# Patient Record
Sex: FEMALE | Race: WHITE | NOT HISPANIC OR LATINO | Employment: FULL TIME | URBAN - METROPOLITAN AREA
[De-identification: names, ages, dates, MRNs, and addresses within clinical notes are randomized per-mention and may not be internally consistent; named-entity substitution may affect disease eponyms.]

---

## 2024-01-15 ENCOUNTER — INITIAL PRENATAL (OUTPATIENT)
Dept: OBSTETRICS AND GYNECOLOGY | Facility: CLINIC | Age: 44
End: 2024-01-15
Payer: COMMERCIAL

## 2024-01-15 VITALS — SYSTOLIC BLOOD PRESSURE: 140 MMHG | WEIGHT: 178 LBS | DIASTOLIC BLOOD PRESSURE: 78 MMHG

## 2024-01-15 DIAGNOSIS — Z34.91 INITIAL OBSTETRIC VISIT IN FIRST TRIMESTER: ICD-10-CM

## 2024-01-15 DIAGNOSIS — Z01.419 PAP TEST, AS PART OF ROUTINE GYNECOLOGICAL EXAMINATION: Primary | ICD-10-CM

## 2024-01-15 PROBLEM — O09.529 AMA (ADVANCED MATERNAL AGE) MULTIGRAVIDA 35+: Status: ACTIVE | Noted: 2024-01-15

## 2024-01-15 PROBLEM — Z34.00 SUPERVISION OF NORMAL FIRST PREGNANCY, ANTEPARTUM: Status: ACTIVE | Noted: 2024-01-15

## 2024-01-15 PROCEDURE — 0501F PRENATAL FLOW SHEET: CPT | Performed by: OBSTETRICS & GYNECOLOGY

## 2024-01-15 RX ORDER — PRENATAL VIT/IRON FUM/FOLIC AC 27MG-0.8MG
TABLET ORAL DAILY
COMMUNITY

## 2024-01-15 NOTE — PROGRESS NOTES
Initial ob visit     CC- Here for care of pregnancy        Abbey Hammond is a 43 y.o. female, , who presents for her first obstetrical visit.  Her last LMP was Patient's last menstrual period was 2023.. Her NYA is Not found.. Current GA is Unknown.     Initial positive test date : 12/15/2023, UPT        Her periods are: regular per pt  Prior obstetric issues: none  Patient's past medical history is significant for:  denies .  Family history of genetic issues (includes FOB): denies  Prior infections concerning in pregnancy (Rash, fever in last 2 weeks): No  Varicella Hx - history of chicken pox  Prior testing for Cystic Fibrosis Carrier or Sickle Cell Trait- denies  Prepregnancy BMI - There is no height or weight on file to calculate BMI.  History of STD: yes GC/CHLAMYDIA  Hx of HSV for patient or partner: no  Ultrasound Today: yes    OB History    Para Term  AB Living   2       1 0   SAB IAB Ectopic Molar Multiple Live Births   1                # Outcome Date GA Lbr [place]/2nd Weight Sex Delivery Anes PTL Lv   2 Current            1 SAB 20               Additional Pertinent History   Last Pap : never had one Result: N/A HPV:  na     Last Completed Pap Smear       This patient has no relevant Health Maintenance data.          History of abnormal Pap smear: no  Family history of uterine, colon, breast, or ovarian cancer: yes - breast cancer  Feelings of Anxiety or Depression: yes - anxiety  Tobacco Usage?: Yes Abbey Hammond  reports that she has been smoking cigarettes. She has never used smokeless tobacco.. I have educated her on the risk of diseases from using tobacco products such as cancer, COPD, and heart disease.     I advised her to quit and she is willing to quit. We have discussed the following method/s for tobacco cessation:  Education Material.  Together we have set a quit date for 1 month from today.  She will follow up with me in 4 weeks or sooner to check on her  progress.    I spent 3  minutes counseling the patient.        Alcohol/Drug Use?: NO  Over the age of 35 at delivery: yes  Desires Genetic Screening: None  Flu Status: Desires at future appt    [facility]    Current Outpatient Medications:     Prenatal Vit-Fe Fumarate-FA (prenatal vitamin 27-0.8) 27-0.8 MG tablet tablet, Take  by mouth Daily., Disp: , Rfl:      History reviewed. No pertinent past medical history.     Past Surgical History:   Procedure Laterality Date    GALLBLADDER SURGERY         Review of Systems   Review of Systems   Constitutional: Negative.    Respiratory: Negative.     Cardiovascular: Negative.    Gastrointestinal: Negative.    Genitourinary: Negative.    Musculoskeletal: Negative.    Neurological: Negative.    Hematological: Negative.    Psychiatric/Behavioral: Negative.         Patient Reports: no problems  Patient Denies: Nausea, Nausea and vomiting, Spotting, Heavy bleeding, Cramping, and Fatigue  All systems reviewed and otherwise normal.    I have reviewed and agree with the HPI, ROS, and historical information as entered above. Cristóbal Quigley MD      /78   Wt 80.7 kg (178 lb)   LMP 11/18/2023     The additional following portions of the patient's history were reviewed and updated as appropriate: allergies, current medications, past family history, past medical history, past social history, past surgical history, and problem list.    Physical Exam  General:  well developed; well nourished  no acute distress   Chest/Respiratory: No labored breathing, normal respiratory effort, normal appearance, no respiratory noises noted   Heart:  normal rate, regular rhythm,  no murmurs, rubs, or gallops   Thyroid: normal to inspection and palpation   Breasts:  Not performed.   Abdomen: soft, non-tender; no masses  no umbilical or inguinal hernias are present  no hepato-splenomegaly   Pelvis: Clinical staff was present for exam  External genitalia:  normal appearance of the external genitalia  including Bartholin's and [place]'s glands.  :  urethral meatus normal;  Vaginal:  normal pink mucosa without prolapse or lesions.  Cervix:  normal appearance.        Assessment and Plan    Problem List Items Addressed This Visit    None  Visit Diagnoses       Pap test, as part of routine gynecological examination    -  Primary    Relevant Orders    LIQUID-BASED PAP SMEAR WITH HPV GENOTYPING REGARDLESS OF INTERPRETATION ([facility])    Initial obstetric visit in first trimester        Relevant Orders    Obstetric Panel    HIV-1 / O / 2 Ag / Antibody    Urine Culture - Urine, Urine, Clean Catch    Urine Drug Screen - Urine, Clean Catch            Pregnancy at Unknown  Reviewed routine prenatal care with the office and educational materials given  Lab(s) Ordered  Discussed options for genetic testing including first trimester nuchal translucency screen, genetic disease carrier testing, quadruple screen, and NIPT  Patient is on Prenatal vitamins  discussed baby aspirin from 10-36 weeks for prevention of preeclampsia   Return in about 4 weeks (around 2/12/2024) for Routine prenatal care.      Cristóbal Quigley MD  01/15/2024

## 2024-01-15 NOTE — PATIENT INSTRUCTIONS
Prenatal Care  Prenatal care is health care during pregnancy. It helps you and your unborn baby (fetus) stay as healthy as possible. Prenatal care may be provided by a midwife, a family practice doctor, a mid-level practitioner (nurse practitioner or physician assistant), or a childbirth and pregnancy doctor (obstetrician).  How does this affect me?  During pregnancy, you will be closely monitored for any new conditions that might develop. To lower your risk of pregnancy complications, you and your health care provider will talk about any underlying conditions you have.  How does this affect my baby?  Early and consistent prenatal care increases the chance that your baby will be healthy during pregnancy. Prenatal care lowers the risk that your baby will be:  Born early (prematurely).  Smaller than expected at birth (small for gestational age).  What can I expect at the first prenatal care visit?  Your first prenatal care visit will likely be the longest. You should schedule your first prenatal care visit as soon as you know that you are pregnant. Your first visit is a good time to talk about any questions or concerns you have about pregnancy.  Medical history  At your visit, you and your health care provider will talk about your medical history, including:  Any past pregnancies.  Your family's medical history.  Medical history of the baby's father.  Any long-term (chronic) health conditions you have and how you manage them.  Any surgeries or procedures you have had.  Any current over-the-counter or prescription medicines, herbs, or supplements that you are taking.  Other factors that could pose a risk to your baby, including:  Exposure to harmful chemicals or radiation at work or at home.  Any substance use, including tobacco, alcohol, and drug use.  Your home setting and your stress levels, including:  Exposure to abuse or violence.  Household financial strain.  Your daily health habits, including diet and  exercise.  Tests and screenings  Your health care provider will:  Measure your weight, height, and blood pressure.  Do a physical exam, including a pelvic and breast exam.  Perform blood tests and urine tests to check for:  Urinary tract infection.  Sexually transmitted infections (STIs).  Low iron levels in your blood (anemia).  Blood type and certain proteins on red blood cells (Rh antibodies).  Infections and immunity to viruses, such as hepatitis B and rubella.  HIV (human immunodeficiency virus).  Discuss your options for genetic screening.  Tips about staying healthy  Your health care provider will also give you information about how to keep yourself and your baby healthy, including:  Nutrition and taking vitamins.  Physical activity.  How to manage pregnancy symptoms such as nausea and vomiting (morning sickness).  Infections and substances that may be harmful to your baby and how to avoid them.  Food safety.  Dental care.  Working.  Travel.  Warning signs to watch for and when to call your health care provider.  How often will I have prenatal care visits?  After your first prenatal care visit, you will have regular visits throughout your pregnancy. The visit schedule is often as follows:  Up to week 28 of pregnancy: once every 4 weeks.  28-36 weeks: once every 2 weeks.  After 36 weeks: every week until delivery.  Some women may have visits more or less often depending on any underlying health conditions and the health of the baby.  Keep all follow-up and prenatal care visits. This is important.  What happens during routine prenatal care visits?  Your health care provider will:  Measure your weight and blood pressure.  Check for fetal heart sounds.  Measure the height of your uterus in your abdomen (fundal height). This may be measured starting around week 20 of pregnancy.  Check the position of your baby inside your uterus.  Ask questions about your diet, sleeping patterns, and whether you can feel the baby  move.  Review warning signs to watch for and signs of labor.  Ask about any pregnancy symptoms you are having and how you are dealing with them. Symptoms may include:  Headaches.  Nausea and vomiting.  Vaginal discharge.  Swelling.  Fatigue.  Constipation.  Changes in your vision.  Feeling persistently sad or anxious.  Any discomfort, including back or pelvic pain.  Bleeding or spotting.  Make a list of questions to ask your health care provider at your routine visits.  What tests might I have during prenatal care visits?  You may have blood, urine, and imaging tests throughout your pregnancy, such as:  Urine tests to check for glucose, protein, or signs of infection.  Glucose tests to check for a form of diabetes that can develop during pregnancy (gestational diabetes mellitus). This is usually done around week 24 of pregnancy.  Ultrasounds to check your baby's growth and development, to check for birth defects, and to check your baby's well-being. These can also help to decide when you should deliver your baby.  A test to check for group B strep (GBS) infection. This is usually done around week 36 of pregnancy.  Genetic testing. This may include blood, fluid, or tissue sampling, or imaging tests, such as an ultrasound. Some genetic tests are done during the first trimester and some are done during the second trimester.  What else can I expect during prenatal care visits?  Your health care provider may recommend getting certain vaccines during pregnancy. These may include:  A yearly flu shot (annual influenza vaccine). This is especially important if you will be pregnant during flu season.  Tdap (tetanus, diphtheria, pertussis) vaccine. Getting this vaccine during pregnancy can protect your baby from whooping cough (pertussis) after birth. This vaccine may be recommended between weeks 27 and 36 of pregnancy.  A COVID-19 vaccine.  Later in your pregnancy, your health care provider may give you information  about:  Childbirth and breastfeeding classes.  Choosing a health care provider for your baby.  Umbilical cord banking.  Breastfeeding.  Birth control after your baby is born.  The hospital labor and delivery unit and how to set up a tour.  Registering at the hospital before you go into labor.  Where to find more information  Office on Women's Health: womenshealth.gov  American Pregnancy Association: americanpregnancy.org  March of Dimes: marchofdimes.org  Summary  Prenatal care helps you and your baby stay as healthy as possible during pregnancy.  Your first prenatal care visit will most likely be the longest.  You will have visits and tests throughout your pregnancy to monitor your health and your baby's health.  Bring a list of questions to your visits to ask your health care provider.  Make sure to keep all follow-up and prenatal care visits.  This information is not intended to replace advice given to you by your health care provider. Make sure you discuss any questions you have with your health care provider.  Document Revised: 09/30/2021 Document Reviewed: 09/30/2021  [employer] Patient Education © 2023 [employer]

## 2024-01-16 LAB — MED LIST OPTION NOT SELECTED: NORMAL

## 2024-01-18 LAB
ABO GROUP BLD: ABNORMAL
AMPHETAMINES UR QL SCN: NEGATIVE NG/ML
BACTERIA UR CULT: NORMAL
BACTERIA UR CULT: NORMAL
BARBITURATES UR QL SCN: NEGATIVE NG/ML
BASOPHILS # BLD AUTO: 0.1 X10E3/UL (ref 0–0.2)
BASOPHILS NFR BLD AUTO: 1 %
BENZODIAZ UR QL SCN: NEGATIVE NG/ML
BLD GP AB SCN SERPL QL: NEGATIVE
BZE UR QL SCN: NEGATIVE NG/ML
CANNABINOIDS UR QL SCN: POSITIVE NG/ML
CREAT UR-MCNC: 150.9 MG/DL (ref 20–300)
EOSINOPHIL # BLD AUTO: 0.2 X10E3/UL (ref 0–0.4)
EOSINOPHIL NFR BLD AUTO: 2 %
ERYTHROCYTE [DISTWIDTH] IN BLOOD BY AUTOMATED COUNT: 14.2 % (ref 11.7–15.4)
HBV SURFACE AG SERPL QL IA: NEGATIVE
HCT VFR BLD AUTO: 45.5 % (ref 34–46.6)
HCV IGG SERPL QL IA: NON REACTIVE
HGB BLD-MCNC: 15 G/DL (ref 11.1–15.9)
HIV 1+2 AB+HIV1 P24 AG SERPL QL IA: NON REACTIVE
IMM GRANULOCYTES # BLD AUTO: 0 X10E3/UL (ref 0–0.1)
IMM GRANULOCYTES NFR BLD AUTO: 0 %
LABORATORY COMMENT REPORT: ABNORMAL
LYMPHOCYTES # BLD AUTO: 1.9 X10E3/UL (ref 0.7–3.1)
LYMPHOCYTES NFR BLD AUTO: 21 %
MCH RBC QN AUTO: 27.8 PG (ref 26.6–33)
MCHC RBC AUTO-ENTMCNC: 33 G/DL (ref 31.5–35.7)
MCV RBC AUTO: 84 FL (ref 79–97)
METHADONE UR QL SCN: NEGATIVE NG/ML
MONOCYTES # BLD AUTO: 0.9 X10E3/UL (ref 0.1–0.9)
MONOCYTES NFR BLD AUTO: 10 %
NEUTROPHILS # BLD AUTO: 6 X10E3/UL (ref 1.4–7)
NEUTROPHILS NFR BLD AUTO: 66 %
OPIATES UR QL SCN: NEGATIVE NG/ML
OXYCODONE+OXYMORPHONE UR QL SCN: NEGATIVE NG/ML
PCP UR QL: NEGATIVE NG/ML
PH UR: 5.2 [PH] (ref 4.5–8.9)
PLATELET # BLD AUTO: 295 X10E3/UL (ref 150–450)
PROPOXYPH UR QL SCN: NEGATIVE NG/ML
RBC # BLD AUTO: 5.4 X10E6/UL (ref 3.77–5.28)
RH BLD: POSITIVE
RPR SER QL: NON REACTIVE
RUBV IGG SERPL IA-ACNC: 3.37 INDEX
WBC # BLD AUTO: 9.1 X10E3/UL (ref 3.4–10.8)

## 2024-01-19 LAB — REF LAB TEST METHOD: NORMAL

## 2024-01-22 ENCOUNTER — DOCUMENTATION (OUTPATIENT)
Dept: OBSTETRICS AND GYNECOLOGY | Facility: CLINIC | Age: 44
End: 2024-01-22
Payer: COMMERCIAL

## 2024-01-23 ENCOUNTER — ROUTINE PRENATAL (OUTPATIENT)
Dept: OBSTETRICS AND GYNECOLOGY | Facility: CLINIC | Age: 44
End: 2024-01-23
Payer: COMMERCIAL

## 2024-01-23 ENCOUNTER — TELEPHONE (OUTPATIENT)
Dept: OBSTETRICS AND GYNECOLOGY | Facility: CLINIC | Age: 44
End: 2024-01-23

## 2024-01-23 VITALS — SYSTOLIC BLOOD PRESSURE: 122 MMHG | DIASTOLIC BLOOD PRESSURE: 74 MMHG | WEIGHT: 177 LBS

## 2024-01-23 DIAGNOSIS — Z3A.09 9 WEEKS GESTATION OF PREGNANCY: ICD-10-CM

## 2024-01-23 DIAGNOSIS — O09.521 MULTIGRAVIDA OF ADVANCED MATERNAL AGE IN FIRST TRIMESTER: Primary | ICD-10-CM

## 2024-01-23 DIAGNOSIS — D18.1 CYSTIC HYGROMA: ICD-10-CM

## 2024-01-23 PROBLEM — Z34.90 PREGNANCY: Status: ACTIVE | Noted: 2024-01-23

## 2024-01-23 PROCEDURE — 0502F SUBSEQUENT PRENATAL CARE: CPT | Performed by: NURSE PRACTITIONER

## 2024-01-23 NOTE — TELEPHONE ENCOUNTER
Caller: Abbey Hammond    Relationship to patient: Self    Best call back number: [phone] (home)  CAN CALL BACK       OB PT VAG BLEEDING BRIGHT RED BLOOD SINCE LAST NIGHT.  NO INTERCOURSE.    UNABLE TO WT CALL

## 2024-01-23 NOTE — PROGRESS NOTES
OB FOLLOW UP  CC- Here for care of pregnancy, W/I cramping and spotting        Abbey Hammond is a 43 y.o.  9w3d patient being seen today for her obstetrical follow up visit. Patient reports bright red bleeding with cramping. Patient states it feels like someone is pressing on her stomach. She is feeling a lot of pressure like gas or a bowel movement. Patient has a colposcopy on Monday.    Her prenatal care is complicated by (and status) : AMA  Patient Active Problem List   Diagnosis    AMA (advanced maternal age) multigravida 35+    Supervision of normal first pregnancy, antepartum    Cystic hygroma    Pregnancy       Desires genetic testing?: Yes with Gender  Flu Status: Declines  Ultrasound Today: Yes    ROS -   Patient Reports : Cramping and bright red bleeding  Patient Denies: Loss of Fluid, Vision Changes, Headaches, Nausea , and Vomiting   Fetal Movement :  absent  All other systems reviewed and are negative.     The additional following portions of the patient's history were reviewed and updated as appropriate: allergies, current medications, past family history, past medical history, past social history, past surgical history, and problem list.    I have reviewed and agree with the HPI, ROS, and historical information as entered above. Tammy Mendez, APRN          /74   Wt 80.3 kg (177 lb)   LMP 2023         EXAM:     Prenatal Vitals  BP: 122/74  Weight: 80.3 kg (177 lb)   Fetal Heart Rate: 165                  Assessment and Plan    Problem List Items Addressed This Visit          Gravid and     AMA (advanced maternal age) multigravida 35+ - Primary    Relevant Orders    [id number] PLUS Core+SCA+ESS - Blood,    Pregnancy       Hematology and Neoplasia    Cystic hygroma    Overview     Possible cystic hygroma at 9 weeks. [id number] drawn. The patient declined PDC referral for NT at the time of the appointment, want to discuss with Dr. Quigley          U/S today with  good interval growth and + FHT, possible cystic hygroma. She describes bleeding as bright red spotting that turns to brown. Mostly occurring in the evening, no recent intercourse. She also complains of bloating and cramping and admits constipation. Advised 80 oz water daily, increase fiber, add miralax daily.     Pregnancy at 9w3d  Labs reviewed from New OB Visit.  Counseled on genetic testing, carrier status and option for NT screen. [id number] today, declines PDC referral today, will d/w Dr. Quigley next week.   Activity and Exercise discussed.  Call for increased bleeding or cramping  Return for Next scheduled follow up.    Tammy Mendez, APRN  01/23/2024

## 2024-01-29 ENCOUNTER — OFFICE VISIT (OUTPATIENT)
Dept: OBSTETRICS AND GYNECOLOGY | Facility: CLINIC | Age: 44
End: 2024-01-29
Payer: COMMERCIAL

## 2024-01-29 VITALS — WEIGHT: 179 LBS | SYSTOLIC BLOOD PRESSURE: 98 MMHG | DIASTOLIC BLOOD PRESSURE: 80 MMHG

## 2024-01-29 DIAGNOSIS — O28.5 MATERNAL SERUM SCREEN POSITIVE FOR TRISOMY 18: Primary | ICD-10-CM

## 2024-01-29 DIAGNOSIS — R87.613 HSIL (HIGH GRADE SQUAMOUS INTRAEPITHELIAL LESION) ON PAP SMEAR OF CERVIX: ICD-10-CM

## 2024-01-29 LAB
5P15 DELETION (CRI-DU-CHAT): NOT DETECTED
CFDNA.FET/CFDNA.TOTAL SFR FETUS: ABNORMAL %
CITATION REF LAB TEST: ABNORMAL
FET 13+18+21+X+Y ANEUP PLAS.CFDNA: POSITIVE
FET 1P36 DEL RISK WBC.DNA+CFDNA QL: NOT DETECTED
FET 22Q11.2 DEL RISK WBC.DNA+CFDNA QL: NOT DETECTED
FET CHR 11Q23 DEL PLAS.CFDNA QL: NOT DETECTED
FET CHR 15Q11 DEL PLAS.CFDNA QL: NOT DETECTED
FET CHR 21 TS PLAS.CFDNA QL: NEGATIVE
FET CHR 4P16 DEL PLAS.CFDNA QL: NOT DETECTED
FET CHR 8Q24 DEL PLAS.CFDNA QL: NOT DETECTED
FET MS X RISK WBC.DNA+CFDNA QL: NOT DETECTED
FET SEX PLAS.CFDNA DOSAGE CFDNA: ABNORMAL
FET TS 13 RISK PLAS.CFDNA QL: NEGATIVE
FET TS 18 RISK WBC.DNA+CFDNA QL: ABNORMAL
FET X + Y ANEUP RISK PLAS.CFDNA SEQ-IMP: NOT DETECTED
GA EST FROM CONCEPTION DATE: ABNORMAL D
GESTATIONAL AGE > 9:: YES
LAB DIRECTOR NAME PROVIDER: ABNORMAL
LAB DIRECTOR NAME PROVIDER: ABNORMAL
LABORATORY COMMENT REPORT: ABNORMAL
LIMITATIONS OF THE TEST: ABNORMAL
NEGATIVE PREDICTIVE VALUE: ABNORMAL
NOTE: ABNORMAL
PERFORMANCE CHARACTERISTICS: ABNORMAL
POSITIVE PREDICTIVE VALUE: ABNORMAL
REF LAB TEST METHOD: ABNORMAL
TEST PERFORMANCE INFO SPEC: ABNORMAL
TRIOSOMY 16: NOT DETECTED
TRISOMY 22: NOT DETECTED

## 2024-01-29 PROCEDURE — 57455 BIOPSY OF CERVIX W/SCOPE: CPT | Performed by: OBSTETRICS & GYNECOLOGY

## 2024-01-29 NOTE — PROGRESS NOTES
Colposcopy Procedure Note        Procedures    Indications: Abbey Hammond is a 43 y.o. female, , whose Patient's last menstrual period was 2023..  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 Prior cervical treatment includes: no treatment. She understands the need for the procedure and is aware of the complications, including post-colposcopic vaginal bleeding, vaginal leukorrhea or cervicitis.  She is aware she may experience discomfort.  After being presented with the risk, benefits, and alternatives the patient wished to proceed.      Urine pregnancy test done in the office today was Positive.      The patient has not had Gardasil.  She is a smoker.      Procedure Details   The risks and benefits of the procedure and Verbal informed consent obtained. Time out: immediate members of the procedure team and patient agree to the following: correct patient, correct site, correct procedure to be performed. Cristóbal Quigley MD      She was positioned in the dorsal lithotomy position and a speculum was inserted into the vagina and excellent visualization of cervix achieved, cervix swabbed x 3 with acetic acid solution. The transformation zone was completely visualized.  A cervical biopsy was obtained at 6 o'clock.  An endocervical curettage was not performed.  This colposcopy was satisfactory.     Findings:  The procedure was notable for:  Physical Exam  Vitals reviewed. Exam conducted with a chaperone present.   Constitutional:       Appearance: Normal appearance. She is normal weight.   HENT:      Head: Normocephalic and atraumatic.   Genitourinary:     General: Normal vulva.      Vagina: Normal.            Comments: Punctations at 6 oclock consistent with high grade lesion.   Skin:     General: Skin is warm and dry.   Neurological:      Mental Status: She is alert and  oriented to person, place, and time.   Psychiatric:         Mood and Affect: Mood normal.         Behavior: Behavior normal.       BP 98/80   Wt 81.2 kg (179 lb)   LMP 2023       Specimens: cervical biopsy  Specimens labelled and sent to Pathology.    Complications: none.    The patient tolerated the procedure very well and with mild discomfort and bleeding.    Assessment and Plan    Problem List Items Addressed This Visit    None  Visit Diagnoses       Maternal serum screen positive for trisomy 18    -  Primary    Relevant Orders    [facility]    HSIL (high grade squamous intraepithelial lesion) on Pap smear of cervix        Relevant Orders    TISSUE EXAM, [facility] ([facility])            Will base further treatment on Pathology findings.  Treatment options discussed with patient.  Post biopsy instructions given to patient.  Repeat pap smear and possible LEEP following delivery    Cristóbal Quigley MD  2024

## 2024-01-30 LAB — REF LAB TEST METHOD: NORMAL

## 2024-02-14 ENCOUNTER — OFFICE VISIT (OUTPATIENT)
Dept: OBSTETRICS AND GYNECOLOGY | Facility: HOSPITAL | Age: 44
End: 2024-02-14
Payer: COMMERCIAL

## 2024-02-14 ENCOUNTER — ROUTINE PRENATAL (OUTPATIENT)
Dept: OBSTETRICS AND GYNECOLOGY | Facility: CLINIC | Age: 44
End: 2024-02-14
Payer: COMMERCIAL

## 2024-02-14 ENCOUNTER — HOSPITAL ENCOUNTER (OUTPATIENT)
Dept: WOMENS IMAGING | Facility: HOSPITAL | Age: 44
Discharge: HOME | End: 2024-02-14
Admitting: OBSTETRICS & GYNECOLOGY
Payer: COMMERCIAL

## 2024-02-14 VITALS
BODY MASS INDEX: 31.96 KG/M2 | WEIGHT: 180.4 LBS | HEIGHT: 63 IN | SYSTOLIC BLOOD PRESSURE: 80 MMHG | DIASTOLIC BLOOD PRESSURE: 59 MMHG

## 2024-02-14 VITALS — WEIGHT: 180 LBS | DIASTOLIC BLOOD PRESSURE: 68 MMHG | SYSTOLIC BLOOD PRESSURE: 122 MMHG | BODY MASS INDEX: 31.89 KG/M2

## 2024-02-14 DIAGNOSIS — R87.613 HIGH GRADE SQUAMOUS INTRAEPITHELIAL LESION (HGSIL) ON CYTOLOGIC SMEAR OF CERVIX: ICD-10-CM

## 2024-02-14 DIAGNOSIS — Z3A.12 12 WEEKS GESTATION OF PREGNANCY: Primary | ICD-10-CM

## 2024-02-14 DIAGNOSIS — R82.998 LEUKOCYTES IN URINE: ICD-10-CM

## 2024-02-14 DIAGNOSIS — D18.1 CYSTIC HYGROMA: ICD-10-CM

## 2024-02-14 DIAGNOSIS — Z34.90 PREGNANCY, UNSPECIFIED GESTATIONAL AGE: ICD-10-CM

## 2024-02-14 DIAGNOSIS — O28.5 ABNORMAL GENETIC TEST DURING PREGNANCY: ICD-10-CM

## 2024-02-14 DIAGNOSIS — R31.29 OTHER MICROSCOPIC HEMATURIA: ICD-10-CM

## 2024-02-14 DIAGNOSIS — O46.90 VAGINAL BLEEDING DURING PREGNANCY: ICD-10-CM

## 2024-02-14 DIAGNOSIS — O28.5 MATERNAL SERUM SCREEN POSITIVE FOR TRISOMY 18: ICD-10-CM

## 2024-02-14 DIAGNOSIS — O09.529 ANTEPARTUM MULTIGRAVIDA OF ADVANCED MATERNAL AGE: ICD-10-CM

## 2024-02-14 DIAGNOSIS — O09.529 ANTEPARTUM MULTIGRAVIDA OF ADVANCED MATERNAL AGE: Primary | ICD-10-CM

## 2024-02-14 LAB
BILIRUB BLD-MCNC: NEGATIVE MG/DL
CLARITY, POC: CLEAR
COLOR UR: YELLOW
GLUCOSE UR STRIP-MCNC: NEGATIVE MG/DL
KETONES UR QL: NEGATIVE
LEUKOCYTE EST, POC: ABNORMAL
NITRITE UR-MCNC: NEGATIVE MG/ML
PH UR: 6 [PH] (ref 5–8)
PROT UR STRIP-MCNC: ABNORMAL MG/DL
RBC # UR STRIP: ABNORMAL /UL
SP GR UR: 1.03 (ref 1–1.03)
UROBILINOGEN UR QL: NORMAL

## 2024-02-14 PROCEDURE — 76813 OB US NUCHAL MEAS 1 GEST: CPT

## 2024-02-14 PROCEDURE — 76801 OB US < 14 WKS SINGLE FETUS: CPT

## 2024-02-14 RX ORDER — AMOXICILLIN AND CLAVULANATE POTASSIUM 875; 125 MG/1; MG/1
1 TABLET, FILM COATED ORAL 2 TIMES DAILY
Qty: 14 TABLET | Refills: 0 | Status: SHIPPED | OUTPATIENT
Start: 2024-02-14 | End: 2024-02-21

## 2024-02-16 LAB
BACTERIA UR CULT: NORMAL
BACTERIA UR CULT: NORMAL

## 2024-02-19 ENCOUNTER — OFFICE VISIT (OUTPATIENT)
Dept: OBSTETRICS AND GYNECOLOGY | Facility: CLINIC | Age: 44
End: 2024-02-19
Payer: COMMERCIAL

## 2024-02-19 VITALS — DIASTOLIC BLOOD PRESSURE: 75 MMHG | SYSTOLIC BLOOD PRESSURE: 118 MMHG | HEIGHT: 63 IN | BODY MASS INDEX: 31.89 KG/M2

## 2024-02-19 DIAGNOSIS — R87.613 HIGH GRADE SQUAMOUS INTRAEPITHELIAL LESION (HGSIL) ON CYTOLOGIC SMEAR OF CERVIX: ICD-10-CM

## 2024-02-19 DIAGNOSIS — O03.9 COMPLETE ABORTION: Primary | ICD-10-CM

## 2024-02-19 PROBLEM — O03.4 INCOMPLETE ABORTION: Status: ACTIVE | Noted: 2024-02-19

## 2024-02-19 PROCEDURE — 99213 OFFICE O/P EST LOW 20 MIN: CPT | Performed by: OBSTETRICS & GYNECOLOGY

## 2024-02-19 NOTE — PROGRESS NOTES
Chief Complaint   Patient presents with    Follow-up     Miscarriage, seen in the ER at [facility] on 2/15/24       Subjective   HPI  Abbey Hammond is a 43 y.o. female, .  Patient's last menstrual period was 2023.. who presents for an ER follow up visit after experiencing a miscarriage. Patient was seen in the ER at [facility] on 2/15/24 for abdominal pain and bleeding. Records are in the patients chart and indicate that upon vaginal exam the fetus and umbilical cord was passed. She was sent to L&D for further monitoring due to concern for retained placenta. Cytotec PO was given in which the placenta was then passed according to her records.    Patient reports her bleeding has lightened up and she is experiencing some cramping. She is taking Ibuprofen as needed. She also reports some headaches that are relieved by Tylenol.    OSH results of labs reviewed and discussed.   OSH results of US reviewed and discussed.     US performed today with no evidence of retained POC.  Discussed findings and will follow HCG levels.       Current Outpatient Medications:     amoxicillin-clavulanate (AUGMENTIN) 875-125 MG per tablet, Take 1 tablet by mouth 2 (Two) Times a Day for 7 days., Disp: 14 tablet, Rfl: 0    Prenatal Vit-Fe Fumarate-FA (prenatal vitamin 27-0.8) 27-0.8 MG tablet tablet, Take  by mouth Daily., Disp: , Rfl:      Past Medical History:   Diagnosis Date    Abnormal Pap smear of cervix         Past Surgical History:   Procedure Laterality Date    GALLBLADDER SURGERY         The additional following portions of the patient's history were reviewed and updated as appropriate: allergies.    Review of Systems   Constitutional: Negative.    Respiratory: Negative.     Cardiovascular: Negative.    Gastrointestinal: Negative.    Genitourinary:  Positive for menstrual problem. Negative for pelvic pain.   Musculoskeletal: Negative.    Neurological: Negative.    Psychiatric/Behavioral:  "Negative.         I have reviewed and agree with the HPI, ROS, and historical information as entered above. Cristóbal Quigley MD      Objective   /75   Ht 160 cm (63\")   LMP 2023   Breastfeeding Unknown   BMI 31.89 kg/m²     Physical Exam  Vitals reviewed.   Constitutional:       Appearance: Normal appearance. She is obese.   HENT:      Head: Normocephalic and atraumatic.   Skin:     General: Skin is warm and dry.   Neurological:      Mental Status: She is alert and oriented to person, place, and time.   Psychiatric:         Mood and Affect: Mood normal.         Behavior: Behavior normal.         Assessment & Plan     Assessment     Problem List Items Addressed This Visit       High grade squamous intraepithelial lesion (HGSIL) on cytologic smear of cervix    Overview     1/15/2024- Pap HSIL, HPV 16 and Pool+    24- Colpo  CERVIX, BIOPSY, 6:00:   Transformation zone with high-grade squamous intraepithelial lesion (MARILYN 3)     CBF-Repeat pap smear and possible LEEP following delivery.          Incomplete  - Primary       Complete     Plan     Will follow hcg levels to zero. NSAIDs prn for cramping.  Return in about 2 weeks (around 3/4/2024) for schedule LEEP in [place] in 2 weeks.        Cristóbal Quigley MD  2024   "

## 2024-02-20 LAB
ERYTHROCYTE [DISTWIDTH] IN BLOOD BY AUTOMATED COUNT: 14.1 % (ref 11.7–15.4)
HCG INTACT+B SERPL-ACNC: 554 MIU/ML
HCT VFR BLD AUTO: 39.2 % (ref 34–46.6)
HGB BLD-MCNC: 12.7 G/DL (ref 11.1–15.9)
MCH RBC QN AUTO: 28 PG (ref 26.6–33)
MCHC RBC AUTO-ENTMCNC: 32.4 G/DL (ref 31.5–35.7)
MCV RBC AUTO: 87 FL (ref 79–97)
PLATELET # BLD AUTO: 325 X10E3/UL (ref 150–450)
RBC # BLD AUTO: 4.53 X10E6/UL (ref 3.77–5.28)
WBC # BLD AUTO: 9.5 X10E3/UL (ref 3.4–10.8)

## 2024-03-06 ENCOUNTER — OFFICE VISIT (OUTPATIENT)
Dept: OBSTETRICS AND GYNECOLOGY | Facility: CLINIC | Age: 44
End: 2024-03-06
Payer: COMMERCIAL

## 2024-03-06 VITALS
WEIGHT: 191.4 LBS | SYSTOLIC BLOOD PRESSURE: 116 MMHG | DIASTOLIC BLOOD PRESSURE: 74 MMHG | BODY MASS INDEX: 33.91 KG/M2 | HEIGHT: 63 IN

## 2024-03-06 DIAGNOSIS — Z87.42 HISTORY OF ABNORMAL CERVICAL PAPANICOLAOU SMEAR: Primary | ICD-10-CM

## 2024-03-06 DIAGNOSIS — R87.613 HIGH GRADE SQUAMOUS INTRAEPITHELIAL LESION (HGSIL) ON CYTOLOGIC SMEAR OF CERVIX: ICD-10-CM

## 2024-03-06 LAB
B-HCG UR QL: NEGATIVE
EXPIRATION DATE: NORMAL
INTERNAL NEGATIVE CONTROL: NORMAL
INTERNAL POSITIVE CONTROL: NORMAL
Lab: NORMAL

## 2024-03-06 NOTE — PROGRESS NOTES
"    LEEP Procedure Note        LEEP Procedure Note  Procedures    Indications: HSIL pap smear    Abbey Hammond is a 43 y.o. female, , who presentstoday for LEEP.  Her previous pap smear was done on 1/15/2024 that resulted in HSIL, HPV POOL / 16 +. She recently underwent a Colposcopy on 2024.  Her pathology revealed HSIL.   After being presented with the risks, benefits, and alternatives the patient wished to proceed.  Verbal informed consent obtained.    Procedure Details   The risks and benefits of the procedure and Verbal informed consent obtained.    Time out: immediate members of the procedure team and patient agree to the following: correct patient, correct site, correct procedure to be performed. Cristóbal Quigley MD      A coated speculum was placed in the vagina and excellent visualization of cervix achieved.  Cervix swabbed with Lugol's Solution.  Once transformation zone identified in entirety, 10 cc of 1% lidocaine with epi used to infiltrate the cervical bed.  Appropriate sized loop was selected and using the cutting setting, I removed the diseased area in 2 pass(es) of the device.    Hemostasis was achieved using the cautery roller ball and Monsel's solution with excellent results.     Tolerated well  No apparent complication     Findings:   Physical Exam  Vitals reviewed. Exam conducted with a chaperone present.   Constitutional:       Appearance: Normal appearance.   HENT:      Head: Normocephalic and atraumatic.   Genitourinary:     General: Normal vulva.      Vagina: Normal.            Comments: See LEEP  Skin:     General: Skin is warm and dry.   Neurological:      Mental Status: She is alert and oriented to person, place, and time.   Psychiatric:         Mood and Affect: Mood normal.         Behavior: Behavior normal.       /74   Ht 160 cm (62.99\")   Wt 86.8 kg (191 lb 6.4 oz)   LMP 2023   Breastfeeding No   BMI 33.91 kg/m²     Specimens: LEEP biopsy of " cervix    Complications: none.    Assessment and Plan    Problem List Items Addressed This Visit       High grade squamous intraepithelial lesion (HGSIL) on cytologic smear of cervix    Overview     1/15/2024- Pap HSIL, HPV 16 and Pool+    1/29/24- Colpo  CERVIX, BIOPSY, 6:00:   Transformation zone with high-grade squamous intraepithelial lesion (MARILYN 3)     CBF-Repeat pap smear and possible LEEP following delivery.           Other Visit Diagnoses       History of abnormal cervical Papanicolaou smear    -  Primary    Relevant Orders    POC Pregnancy, Urine (Completed)            1) Send specimen for pathology.  2) Follow up will be based on ASCCP guidelines, but at minimum expect pap at 12 & 24 months.   3) Post procedure expectations and warning signs reviewed.  4) Anticipate results by the next week, call if have not heard by then.     Cristóbal Quigley MD  03/06/2024

## 2024-03-07 LAB — REF LAB TEST METHOD: NORMAL

## 2024-03-27 ENCOUNTER — OFFICE VISIT (OUTPATIENT)
Dept: OBSTETRICS AND GYNECOLOGY | Facility: CLINIC | Age: 44
End: 2024-03-27
Payer: COMMERCIAL

## 2024-03-27 VITALS
BODY MASS INDEX: 34.69 KG/M2 | DIASTOLIC BLOOD PRESSURE: 78 MMHG | HEIGHT: 63 IN | SYSTOLIC BLOOD PRESSURE: 114 MMHG | WEIGHT: 195.8 LBS

## 2024-03-27 DIAGNOSIS — Z48.89 POSTOPERATIVE VISIT: Primary | ICD-10-CM

## 2024-03-27 PROCEDURE — 99024 POSTOP FOLLOW-UP VISIT: CPT | Performed by: OBSTETRICS & GYNECOLOGY

## 2024-03-27 NOTE — PROGRESS NOTES
"     OBGYN Postoperative Exam Note          Subjective   Chief Complaint   Patient presents with    Post-op     Abbey Hammond is a 43 y.o. year old  presenting to be seen for her post-operative visit. She is S/P LEEP on 3/6/2024 at [facility]. 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 Currently she reports no problems with eating, bowel movements, voiding, or wound drainage and pain is well controlled.    The pathology results from her procedure are in Abbey's record and are consistent with MARILYN III.      OTHER THINGS SHE WANTS TO DISCUSS TODAY:  Nothing else      Current Outpatient Medications:     Prenatal Vit-Fe Fumarate-FA (prenatal vitamin 27-0.8) 27-0.8 MG tablet tablet, Take  by mouth Daily., Disp: , Rfl:      Past Medical History:   Diagnosis Date    Abnormal Pap smear of cervix         Past Surgical History:   Procedure Laterality Date    GALLBLADDER SURGERY         The following portions of the patient's history were reviewed and updated as appropriate:current medications and allergies    Review of Systems   Constitutional: Negative.    Respiratory: Negative.     Cardiovascular: Negative.    Gastrointestinal: Negative.    Genitourinary: Negative.    Musculoskeletal: Negative.    Neurological: Negative.    Psychiatric/Behavioral: Negative.            Objective   /78   Ht 160 cm (62.99\")   Wt 88.8 kg (195 lb 12.8 oz)   LMP 2023   Breastfeeding No   BMI 34.69 kg/m²     Physical Exam  Vitals reviewed. Exam conducted with a chaperone present.   Constitutional:       Appearance: Normal appearance.   HENT:      Head: Normocephalic and atraumatic.   Abdominal:      General: Abdomen is flat. Bowel sounds are normal.      Palpations: Abdomen is soft.   Genitourinary:     General: Normal vulva.      Vagina: Normal.      Cervix: Normal.      Uterus: Normal.       Adnexa: Right adnexa normal and left adnexa normal.            Comments: Healing well s/p leep  Skin:     General: Skin is warm " and dry.   Neurological:      Mental Status: She is alert and oriented to person, place, and time.   Psychiatric:         Mood and Affect: Mood normal.         Behavior: Behavior normal.              Assessment   S/P LEEP     Plan   May return to full activity with no restrictions  Pathology was reviewed with patient  The importance of keeping all planned follow-up and taking all medications as prescribed was emphasized.  Return in about 6 months (around 9/27/2024) for Repeat pap smear in [facility].              Cristóbal Quigley MD  03/27/2024